# Patient Record
Sex: MALE | Race: WHITE | Employment: OTHER | URBAN - METROPOLITAN AREA
[De-identification: names, ages, dates, MRNs, and addresses within clinical notes are randomized per-mention and may not be internally consistent; named-entity substitution may affect disease eponyms.]

---

## 2018-08-05 ENCOUNTER — HOSPITAL ENCOUNTER (OUTPATIENT)
Age: 63
Setting detail: OBSERVATION
Discharge: HOME HEALTH | End: 2018-08-07
Attending: EMERGENCY MEDICINE | Admitting: FAMILY MEDICINE
Payer: OTHER GOVERNMENT

## 2018-08-05 ENCOUNTER — APPOINTMENT (OUTPATIENT)
Dept: CT IMAGING | Age: 63
End: 2018-08-05
Payer: OTHER GOVERNMENT

## 2018-08-05 ENCOUNTER — APPOINTMENT (OUTPATIENT)
Dept: GENERAL RADIOLOGY | Age: 63
End: 2018-08-05
Payer: OTHER GOVERNMENT

## 2018-08-05 DIAGNOSIS — R55 SYNCOPE AND COLLAPSE: Primary | ICD-10-CM

## 2018-08-05 LAB
ACETAMINOPHEN LEVEL: <5 MCG/ML (ref 10–30)
ALBUMIN SERPL-MCNC: 4.6 G/DL (ref 3.5–5.2)
ALP BLD-CCNC: 73 U/L (ref 40–129)
ALT SERPL-CCNC: 30 U/L (ref 0–40)
AMPHETAMINE SCREEN, URINE: NOT DETECTED
ANION GAP SERPL CALCULATED.3IONS-SCNC: 19 MMOL/L (ref 7–16)
AST SERPL-CCNC: 25 U/L (ref 0–39)
BARBITURATE SCREEN URINE: NOT DETECTED
BENZODIAZEPINE SCREEN, URINE: NOT DETECTED
BILIRUB SERPL-MCNC: 0.3 MG/DL (ref 0–1.2)
BUN BLDV-MCNC: 19 MG/DL (ref 8–23)
CALCIUM SERPL-MCNC: 9.6 MG/DL (ref 8.6–10.2)
CANNABINOID SCREEN URINE: NOT DETECTED
CHLORIDE BLD-SCNC: 97 MMOL/L (ref 98–107)
CO2: 23 MMOL/L (ref 22–29)
COCAINE METABOLITE SCREEN URINE: NOT DETECTED
CREAT SERPL-MCNC: 1.1 MG/DL (ref 0.7–1.2)
D DIMER: 777 NG/ML DDU
ETHANOL: 196 MG/DL (ref 0–0.08)
GFR AFRICAN AMERICAN: >60
GFR NON-AFRICAN AMERICAN: >60 ML/MIN/1.73
GLUCOSE BLD-MCNC: 149 MG/DL (ref 74–109)
HCT VFR BLD CALC: 37.6 % (ref 37–54)
HEMOGLOBIN: 12.7 G/DL (ref 12.5–16.5)
INR BLD: 1.1
MCH RBC QN AUTO: 30 PG (ref 26–35)
MCHC RBC AUTO-ENTMCNC: 33.8 % (ref 32–34.5)
MCV RBC AUTO: 88.9 FL (ref 80–99.9)
METHADONE SCREEN, URINE: NOT DETECTED
OPIATE SCREEN URINE: NOT DETECTED
PDW BLD-RTO: 14.9 FL (ref 11.5–15)
PHENCYCLIDINE SCREEN URINE: NOT DETECTED
PLATELET # BLD: 280 E9/L (ref 130–450)
PMV BLD AUTO: 10.7 FL (ref 7–12)
POTASSIUM SERPL-SCNC: 3.5 MMOL/L (ref 3.5–5)
PROPOXYPHENE SCREEN: NOT DETECTED
PROTHROMBIN TIME: 12.3 SEC (ref 9.3–12.4)
RBC # BLD: 4.23 E12/L (ref 3.8–5.8)
SALICYLATE, SERUM: <0.3 MG/DL (ref 0–30)
SODIUM BLD-SCNC: 139 MMOL/L (ref 132–146)
TOTAL PROTEIN: 7.5 G/DL (ref 6.4–8.3)
TRICYCLIC ANTIDEPRESSANTS SCREEN SERUM: NEGATIVE NG/ML
TROPONIN: <0.01 NG/ML (ref 0–0.03)
WBC # BLD: 10.5 E9/L (ref 4.5–11.5)

## 2018-08-05 PROCEDURE — 85027 COMPLETE CBC AUTOMATED: CPT

## 2018-08-05 PROCEDURE — 80307 DRUG TEST PRSMV CHEM ANLYZR: CPT

## 2018-08-05 PROCEDURE — 70450 CT HEAD/BRAIN W/O DYE: CPT

## 2018-08-05 PROCEDURE — 36415 COLL VENOUS BLD VENIPUNCTURE: CPT

## 2018-08-05 PROCEDURE — 85610 PROTHROMBIN TIME: CPT

## 2018-08-05 PROCEDURE — G0480 DRUG TEST DEF 1-7 CLASSES: HCPCS

## 2018-08-05 PROCEDURE — 84484 ASSAY OF TROPONIN QUANT: CPT

## 2018-08-05 PROCEDURE — 71275 CT ANGIOGRAPHY CHEST: CPT

## 2018-08-05 PROCEDURE — 99285 EMERGENCY DEPT VISIT HI MDM: CPT

## 2018-08-05 PROCEDURE — 71045 X-RAY EXAM CHEST 1 VIEW: CPT

## 2018-08-05 PROCEDURE — 80053 COMPREHEN METABOLIC PANEL: CPT

## 2018-08-05 PROCEDURE — 85378 FIBRIN DEGRADE SEMIQUANT: CPT

## 2018-08-06 ENCOUNTER — APPOINTMENT (OUTPATIENT)
Dept: MRI IMAGING | Age: 63
End: 2018-08-06
Payer: OTHER GOVERNMENT

## 2018-08-06 PROBLEM — R55 SYNCOPE AND COLLAPSE: Status: ACTIVE | Noted: 2018-08-06

## 2018-08-06 PROBLEM — K05.6 PERIODONTAL DISEASE: Status: ACTIVE | Noted: 2018-08-06

## 2018-08-06 PROBLEM — I10 HTN (HYPERTENSION): Status: ACTIVE | Noted: 2018-08-06

## 2018-08-06 PROBLEM — R29.90 STROKE-LIKE SYMPTOMS: Status: ACTIVE | Noted: 2018-08-06

## 2018-08-06 PROBLEM — E11.9 TYPE 2 DIABETES MELLITUS (HCC): Status: ACTIVE | Noted: 2018-08-06

## 2018-08-06 PROBLEM — F10.939 ALCOHOL WITHDRAWAL (HCC): Status: ACTIVE | Noted: 2018-08-06

## 2018-08-06 PROBLEM — F10.929 ALCOHOL INTOXICATION (HCC): Status: ACTIVE | Noted: 2018-08-06

## 2018-08-06 LAB
ANION GAP SERPL CALCULATED.3IONS-SCNC: 19 MMOL/L (ref 7–16)
BACTERIA: ABNORMAL /HPF
BILIRUBIN URINE: NEGATIVE
BLOOD, URINE: NEGATIVE
BUN BLDV-MCNC: 18 MG/DL (ref 8–23)
CALCIUM SERPL-MCNC: 9.6 MG/DL (ref 8.6–10.2)
CHLORIDE BLD-SCNC: 97 MMOL/L (ref 98–107)
CHOLESTEROL, TOTAL: 338 MG/DL (ref 0–199)
CLARITY: CLEAR
CO2: 24 MMOL/L (ref 22–29)
COLOR: YELLOW
CREAT SERPL-MCNC: 1.1 MG/DL (ref 0.7–1.2)
GFR AFRICAN AMERICAN: >60
GFR NON-AFRICAN AMERICAN: >60 ML/MIN/1.73
GLUCOSE BLD-MCNC: 107 MG/DL (ref 74–109)
GLUCOSE URINE: NEGATIVE MG/DL
HBA1C MFR BLD: 7.5 % (ref 4–5.6)
HDLC SERPL-MCNC: 23 MG/DL
KETONES, URINE: ABNORMAL MG/DL
LDL CHOLESTEROL CALCULATED: ABNORMAL MG/DL (ref 0–99)
LEUKOCYTE ESTERASE, URINE: NEGATIVE
MAGNESIUM: 1.9 MG/DL (ref 1.6–2.6)
NITRITE, URINE: NEGATIVE
PH UA: 5.5 (ref 5–9)
POTASSIUM REFLEX MAGNESIUM: 3.4 MMOL/L (ref 3.5–5)
PROTEIN UA: NEGATIVE MG/DL
RBC UA: ABNORMAL /HPF (ref 0–2)
SODIUM BLD-SCNC: 140 MMOL/L (ref 132–146)
SPECIFIC GRAVITY UA: 1.02 (ref 1–1.03)
TRIGL SERPL-MCNC: 548 MG/DL (ref 0–149)
TSH SERPL DL<=0.05 MIU/L-ACNC: 1.54 UIU/ML (ref 0.27–4.2)
UROBILINOGEN, URINE: 0.2 E.U./DL
VLDLC SERPL CALC-MCNC: ABNORMAL MG/DL
WBC UA: ABNORMAL /HPF (ref 0–5)

## 2018-08-06 PROCEDURE — 70551 MRI BRAIN STEM W/O DYE: CPT

## 2018-08-06 PROCEDURE — 80061 LIPID PANEL: CPT

## 2018-08-06 PROCEDURE — 81001 URINALYSIS AUTO W/SCOPE: CPT

## 2018-08-06 PROCEDURE — G8987 SELF CARE CURRENT STATUS: HCPCS

## 2018-08-06 PROCEDURE — 2500000003 HC RX 250 WO HCPCS: Performed by: FAMILY MEDICINE

## 2018-08-06 PROCEDURE — G8988 SELF CARE GOAL STATUS: HCPCS

## 2018-08-06 PROCEDURE — 6360000002 HC RX W HCPCS: Performed by: FAMILY MEDICINE

## 2018-08-06 PROCEDURE — 97166 OT EVAL MOD COMPLEX 45 MIN: CPT

## 2018-08-06 PROCEDURE — 96372 THER/PROPH/DIAG INJ SC/IM: CPT

## 2018-08-06 PROCEDURE — 83735 ASSAY OF MAGNESIUM: CPT

## 2018-08-06 PROCEDURE — 2580000003 HC RX 258: Performed by: FAMILY MEDICINE

## 2018-08-06 PROCEDURE — 84443 ASSAY THYROID STIM HORMONE: CPT

## 2018-08-06 PROCEDURE — 36415 COLL VENOUS BLD VENIPUNCTURE: CPT

## 2018-08-06 PROCEDURE — 6360000004 HC RX CONTRAST MEDICATION: Performed by: RADIOLOGY

## 2018-08-06 PROCEDURE — G0378 HOSPITAL OBSERVATION PER HR: HCPCS

## 2018-08-06 PROCEDURE — 83036 HEMOGLOBIN GLYCOSYLATED A1C: CPT

## 2018-08-06 PROCEDURE — G8979 MOBILITY GOAL STATUS: HCPCS

## 2018-08-06 PROCEDURE — 6370000000 HC RX 637 (ALT 250 FOR IP): Performed by: FAMILY MEDICINE

## 2018-08-06 PROCEDURE — 97161 PT EVAL LOW COMPLEX 20 MIN: CPT

## 2018-08-06 PROCEDURE — 96374 THER/PROPH/DIAG INJ IV PUSH: CPT

## 2018-08-06 PROCEDURE — 6370000000 HC RX 637 (ALT 250 FOR IP): Performed by: INTERNAL MEDICINE

## 2018-08-06 PROCEDURE — 87040 BLOOD CULTURE FOR BACTERIA: CPT

## 2018-08-06 PROCEDURE — 80048 BASIC METABOLIC PNL TOTAL CA: CPT

## 2018-08-06 PROCEDURE — G8978 MOBILITY CURRENT STATUS: HCPCS

## 2018-08-06 RX ORDER — LORAZEPAM 2 MG/ML
4 INJECTION INTRAMUSCULAR
Status: DISCONTINUED | OUTPATIENT
Start: 2018-08-06 | End: 2018-08-07 | Stop reason: HOSPADM

## 2018-08-06 RX ORDER — CLONIDINE HYDROCHLORIDE 0.1 MG/1
0.1 TABLET ORAL 2 TIMES DAILY
Status: ON HOLD | COMMUNITY
End: 2018-08-07 | Stop reason: HOSPADM

## 2018-08-06 RX ORDER — HYDROCHLOROTHIAZIDE 12.5 MG/1
12.5 TABLET ORAL DAILY
Status: DISCONTINUED | OUTPATIENT
Start: 2018-08-06 | End: 2018-08-07 | Stop reason: HOSPADM

## 2018-08-06 RX ORDER — ASPIRIN 81 MG/1
81 TABLET ORAL DAILY
Status: DISCONTINUED | OUTPATIENT
Start: 2018-08-06 | End: 2018-08-07 | Stop reason: HOSPADM

## 2018-08-06 RX ORDER — LORAZEPAM 2 MG/ML
2 INJECTION INTRAMUSCULAR
Status: DISCONTINUED | OUTPATIENT
Start: 2018-08-06 | End: 2018-08-07 | Stop reason: HOSPADM

## 2018-08-06 RX ORDER — THIAMINE MONONITRATE (VIT B1) 100 MG
100 TABLET ORAL DAILY
Status: DISCONTINUED | OUTPATIENT
Start: 2018-08-07 | End: 2018-08-07 | Stop reason: HOSPADM

## 2018-08-06 RX ORDER — ONDANSETRON 2 MG/ML
4 INJECTION INTRAMUSCULAR; INTRAVENOUS EVERY 6 HOURS PRN
Status: DISCONTINUED | OUTPATIENT
Start: 2018-08-06 | End: 2018-08-07 | Stop reason: HOSPADM

## 2018-08-06 RX ORDER — FOLIC ACID 1 MG/1
1 TABLET ORAL DAILY
Status: DISCONTINUED | OUTPATIENT
Start: 2018-08-07 | End: 2018-08-07 | Stop reason: HOSPADM

## 2018-08-06 RX ORDER — LORAZEPAM 2 MG/ML
0.5 INJECTION INTRAMUSCULAR ONCE
Status: COMPLETED | OUTPATIENT
Start: 2018-08-06 | End: 2018-08-06

## 2018-08-06 RX ORDER — CHLORAL HYDRATE 500 MG
3000 CAPSULE ORAL 3 TIMES DAILY
Status: ON HOLD | COMMUNITY
End: 2018-08-07

## 2018-08-06 RX ORDER — LORAZEPAM 1 MG/1
1 TABLET ORAL
Status: DISCONTINUED | OUTPATIENT
Start: 2018-08-06 | End: 2018-08-07 | Stop reason: HOSPADM

## 2018-08-06 RX ORDER — GLYBURIDE 5 MG/1
5 TABLET ORAL
Status: ON HOLD | COMMUNITY
End: 2018-08-07 | Stop reason: HOSPADM

## 2018-08-06 RX ORDER — LORAZEPAM 1 MG/1
4 TABLET ORAL
Status: DISCONTINUED | OUTPATIENT
Start: 2018-08-06 | End: 2018-08-07 | Stop reason: HOSPADM

## 2018-08-06 RX ORDER — LORAZEPAM 1 MG/1
3 TABLET ORAL
Status: DISCONTINUED | OUTPATIENT
Start: 2018-08-06 | End: 2018-08-07 | Stop reason: HOSPADM

## 2018-08-06 RX ORDER — POTASSIUM CHLORIDE AND SODIUM CHLORIDE 450; 150 MG/100ML; MG/100ML
INJECTION, SOLUTION INTRAVENOUS CONTINUOUS
Status: DISCONTINUED | OUTPATIENT
Start: 2018-08-06 | End: 2018-08-07 | Stop reason: HOSPADM

## 2018-08-06 RX ORDER — SODIUM CHLORIDE 0.9 % (FLUSH) 0.9 %
10 SYRINGE (ML) INJECTION PRN
Status: DISCONTINUED | OUTPATIENT
Start: 2018-08-06 | End: 2018-08-07 | Stop reason: HOSPADM

## 2018-08-06 RX ORDER — LISINOPRIL 40 MG/1
40 TABLET ORAL DAILY
Status: ON HOLD | COMMUNITY
End: 2018-08-07

## 2018-08-06 RX ORDER — POTASSIUM CHLORIDE 20 MEQ/1
40 TABLET, EXTENDED RELEASE ORAL ONCE
Status: COMPLETED | OUTPATIENT
Start: 2018-08-06 | End: 2018-08-06

## 2018-08-06 RX ORDER — LORAZEPAM 2 MG/ML
1 INJECTION INTRAMUSCULAR
Status: DISCONTINUED | OUTPATIENT
Start: 2018-08-06 | End: 2018-08-07 | Stop reason: HOSPADM

## 2018-08-06 RX ORDER — LORAZEPAM 1 MG/1
2 TABLET ORAL
Status: DISCONTINUED | OUTPATIENT
Start: 2018-08-06 | End: 2018-08-07 | Stop reason: HOSPADM

## 2018-08-06 RX ORDER — HYDROCHLOROTHIAZIDE 12.5 MG/1
12.5 TABLET ORAL DAILY
COMMUNITY

## 2018-08-06 RX ORDER — AMLODIPINE BESYLATE 5 MG/1
5 TABLET ORAL DAILY
Status: ON HOLD | COMMUNITY
End: 2018-08-07 | Stop reason: HOSPADM

## 2018-08-06 RX ORDER — MULTIVITAMIN WITH FOLIC ACID 400 MCG
1 TABLET ORAL DAILY
Status: DISCONTINUED | OUTPATIENT
Start: 2018-08-07 | End: 2018-08-07 | Stop reason: HOSPADM

## 2018-08-06 RX ORDER — LORAZEPAM 2 MG/ML
3 INJECTION INTRAMUSCULAR
Status: DISCONTINUED | OUTPATIENT
Start: 2018-08-06 | End: 2018-08-07 | Stop reason: HOSPADM

## 2018-08-06 RX ORDER — SODIUM CHLORIDE 0.9 % (FLUSH) 0.9 %
10 SYRINGE (ML) INJECTION EVERY 12 HOURS SCHEDULED
Status: DISCONTINUED | OUTPATIENT
Start: 2018-08-06 | End: 2018-08-07 | Stop reason: HOSPADM

## 2018-08-06 RX ADMIN — Medication 10 ML: at 04:31

## 2018-08-06 RX ADMIN — HYDROCHLOROTHIAZIDE 12.5 MG: 12.5 TABLET ORAL at 18:26

## 2018-08-06 RX ADMIN — ENOXAPARIN SODIUM 40 MG: 100 INJECTION SUBCUTANEOUS at 08:39

## 2018-08-06 RX ADMIN — POTASSIUM CHLORIDE 40 MEQ: 20 TABLET, EXTENDED RELEASE ORAL at 07:05

## 2018-08-06 RX ADMIN — IOPAMIDOL 60 ML: 755 INJECTION, SOLUTION INTRAVENOUS at 00:22

## 2018-08-06 RX ADMIN — FOLIC ACID: 5 INJECTION, SOLUTION INTRAMUSCULAR; INTRAVENOUS; SUBCUTANEOUS at 04:26

## 2018-08-06 RX ADMIN — LORAZEPAM 0.5 MG: 2 INJECTION INTRAMUSCULAR; INTRAVENOUS at 02:51

## 2018-08-06 RX ADMIN — SODIUM CHLORIDE AND POTASSIUM CHLORIDE: 4.5; 1.49 INJECTION, SOLUTION INTRAVENOUS at 16:06

## 2018-08-06 RX ADMIN — ASPIRIN 81 MG: 81 TABLET, COATED ORAL at 08:39

## 2018-08-06 ASSESSMENT — PAIN DESCRIPTION - ONSET: ONSET: GRADUAL

## 2018-08-06 ASSESSMENT — PAIN SCALES - GENERAL
PAINLEVEL_OUTOF10: 4
PAINLEVEL_OUTOF10: 5

## 2018-08-06 ASSESSMENT — PAIN DESCRIPTION - PAIN TYPE
TYPE: ACUTE PAIN
TYPE: ACUTE PAIN

## 2018-08-06 ASSESSMENT — PAIN DESCRIPTION - FREQUENCY
FREQUENCY: INTERMITTENT
FREQUENCY: INTERMITTENT

## 2018-08-06 ASSESSMENT — PAIN DESCRIPTION - LOCATION
LOCATION: HEAD
LOCATION: GENERALIZED

## 2018-08-06 ASSESSMENT — PAIN DESCRIPTION - DESCRIPTORS
DESCRIPTORS: ACHING;DISCOMFORT;SORE
DESCRIPTORS: ACHING;DISCOMFORT;SORE

## 2018-08-06 ASSESSMENT — PAIN DESCRIPTION - ORIENTATION: ORIENTATION: POSTERIOR

## 2018-08-06 NOTE — H&P
pertinent surgical history. Medications Prior to Admission: Not available at this time     Prior to Admission medications    Not on File       Allergies:  Patient has no known allergies. Social History:      The patient currently lives At home. TOBACCO:   reports that he has never smoked. He has never used smokeless tobacco.  ETOH:   reports that he drinks alcohol. drinks vodka almost on a daily basis 4-5 drinks. Denies drug use. Family History:     Reviewed in detail Positive as follows: Coronary artery disease, diabetes and hypertension    History reviewed. No pertinent family history. REVIEW OF SYSTEMS:   Pertinent positives as noted in the HPI. All other systems reviewed and negative. PHYSICAL EXAM:    BP (!) 163/99   Pulse 83   Temp 98.1 °F (36.7 °C)   Resp 20   Ht 5' 7\" (1.702 m)   Wt 170 lb (77.1 kg)   SpO2 99%   BMI 26.63 kg/m²     General appearance:  Middle-aged male, appears weak, No apparent distress painful or respiratory, appears stated age and cooperative. HEENT:  Normal cephalic, atraumatic without obvious deformity. Pupils equal, round, and reactive to light. Extra ocular muscles intact. Conjunctivae/corneas clear. Poor dentition with partial loss of tooth right upper jaw  Neck: Supple, with full range of motion. No jugular venous distention. Trachea midline. Respiratory:  Normal respiratory effort. Clear to auscultation, bilaterally without Rales/Wheezes/Rhonchi. Cardiovascular:  Regular rate and rhythm with normal S1/S2 without murmurs, rubs or gallops. Abdomen: Soft, non-tender, non-distended with normal bowel sounds. Musculoskeletal:  No clubbing, cyanosis or edema bilaterally. Full range of motion without deformity. Skin: Skin color, texture, turgor normal.  No rashes or lesions. Neurologic:  Tremors worse with activity.  Cranial nerves: II-XII intact, grossly non-focal.  Psychiatric:  Alert and oriented, thought content appropriate, normal insight  Capillary Continuous cardiac monitoring to evaluate for cardiac arrhythmias. Orthostatic blood pressure check. IV fluids. #2. Alcohol intoxication and withdrawal. Put on CIWA protocol with Ativan. Banana bag. Replace vitamins. #3. Strokelike symptoms. Patient complaining of numbness and subjective weakness of the left side. Plan for MRI. If stroke is found on MRI, would obtain echo and carotid studies. Lipid panel. neuro consult as needed. #4. Periodontal disease. No pain at the moment. Can see dentist outpatient. #5. Type II diabetes. Insulin sliding scale. Check A1c.  6. Hypertension, uncontrolled, home meds. 7. Multiple mediastinal and axillary lymphadenopathy of undetermined significance. Follow-up imaging outpatient. DVT Prophylaxis: Lovenox  Diet:  cardiac  Code Status: No Order FULL    PT/OT Eval Status: eval and treat    Dispo - obs/tele       Micah King MD    Thank you No primary care provider on file. for the opportunity to be involved in this patient's care.

## 2018-08-06 NOTE — ED NOTES
Bed: 05  Expected date:   Expected time:   Means of arrival:   Comments:  Anne Crawford RN  08/05/18 [id number]

## 2018-08-06 NOTE — ED PROVIDER NOTES
Department of Emergency Medicine   ED  Provider Note  Admit Date/RoomTime: 8/5/2018 10:38 PM  ED Room: 05/05          History of Present Illness:  8/5/18, Time: 10:37 PM         Stuart Hanson is a 60 y.o. male presenting to the ED for loss of consciousness, beginning today. The complaint has been intermittent, moderate in severity, and worsened by nothing. Pt arrived via EMS from home. Pt had two syncopal episodes today. Pt states he was walking into his house when he woke upon the cement floor. He complains of left-sided head pressure, ongoing for a few hours now. Pt reports no associated chest pain or shortness of breath. Pt reports no neck or back pain. He reports no fevers or cough. [facility] Review of Systems:   Pertinent positives and negatives are stated within HPI, all other systems reviewed and are negative.      --------------------------------------------- PAST HISTORY ---------------------------------------------  Past Medical History:  has no past medical history on file. Past Surgical History:  has no past surgical history on file. Social History:  reports that he has never smoked. He has never used smokeless tobacco. He reports that he drinks alcohol. He reports that he does not use drugs. Family History: family history is not on file. The patients home medications have been reviewed. Allergies: Patient has no known allergies.       ---------------------------------------------------PHYSICAL EXAM--------------------------------------    Constitutional/General: Alert and oriented x3, slow to respond, Mild distress  Head: Normocephalic and atraumatic  Eyes: PERRL, EOMI, conjunctiva normal  Mouth: Oropharynx clear, handling secretions, no trismus, no asymmetry of the posterior oropharynx or uvular edema  Neck: Supple, full ROM, non tender to palpation in the midline, no stridor, no crepitus, no meningeal signs  Respiratory: Lungs clear to D-Dimer, Quantitative   Result Value Ref Range    D-Dimer, Quant 777 ng/mL DDU   Serum Drug Screen   Result Value Ref Range    Ethanol Lvl 196 mg/dL    Acetaminophen Level <5.0 (L) 10.0 - 97.0 mcg/mL    Salicylate, Serum <9.8 0.0 - 30.0 mg/dL    TCA Scrn NEGATIVE Cutoff:300 ng/mL   Urine Drug Screen   Result Value Ref Range    Amphetamine Screen, Urine NOT DETECTED Negative <1000 ng/mL    Barbiturate Screen, Ur NOT DETECTED Negative < 200 ng/mL    Benzodiazepine Screen, Urine NOT DETECTED Negative < 200 ng/mL    Cannabinoid Scrn, Ur NOT DETECTED Negative < 50ng/mL    Cocaine Metabolite Screen, Urine NOT DETECTED Negative < 300 ng/mL    Opiate Scrn, Ur NOT DETECTED Negative < 300ng/mL    PCP Screen, Urine NOT DETECTED Negative < 25 ng/mL    Methadone Screen, Urine NOT DETECTED Negative <300 ng/mL    Propoxyphene Scrn, Ur NOT DETECTED Negative <300 ng/mL       RADIOLOGY:  Interpreted by Radiologist.  CTA CHEST W CONTRAST   Final Result      1. No pulmonary embolism. 2.  Incidental/non-acute findings are described above. This report has been electronically signed by Dony Silva MD.      CT Head WO Contrast   Final Result   1. No acute intracranial hemorrhage identified. 2.  Periapical lucency/periodontal disease in the right maxilla with a tooth    fragment noted. 3.  Additional nonacute/incidental findings as described above. This report has been electronically signed by Melissa Piper MD.      XR CHEST PORTABLE   Final Result   No acute cardiopulmonary process. EKG:  EKG: This EKG is signed and interpreted by me. Rate: 87  Rhythm: Sinus  Interpretation: non-specific EKG  Comparison: no previous EKG available        ------------------------- NURSING NOTES AND VITALS REVIEWED ---------------------------   The nursing notes within the ED encounter and vital signs as below have been reviewed by myself.   BP (!) 160/79   Pulse 87   Temp 98.1 °F (36.7 °C)   Resp 18   Ht 5' 7\" (1.702 m)   Wt 170 lb (77.1 kg)   SpO2 96%   BMI 26.63 kg/m²   Oxygen Saturation Interpretation: Normal    The patients available past medical records and past encounters were reviewed. ------------------------------ ED COURSE/MEDICAL DECISION MAKING----------------------  Medications   iopamidol (ISOVUE-370) 76 % injection 60 mL (60 mLs Intravenous Given 8/6/18 0022)       Medical Decision Making:    Pt arrived via EMS for syncopal episodes. Pt evaluated. Imaging, EKG, and labs obtained and reviewed. Results discussed with pt. This patient's ED course included: a personal history and physicial examination, re-evaluation prior to disposition, cardiac monitoring and continuous pulse oximetry    This patient has been closely monitored during their ED course. Re-Evaluations:           Re-evaluation. Patients symptoms show no change   rechecked and neurologically intact. Patient is in no distress. Consultations:  IM    Counseling: The emergency provider has spoken with the patient and discussed todays results, in addition to providing specific details for the plan of care and counseling regarding the diagnosis and prognosis. Questions are answered at this time and they are agreeable with the plan.       --------------------------------- IMPRESSION AND DISPOSITION ---------------------------------    IMPRESSION  1. Syncope and collapse        DISPOSITION  Disposition: admit  Patient condition is stable    8/5/18, 10:37 PM.    This note is prepared by Sherly Gupta, acting as Scribe for Sukumar Rivas MD.    Sukumar Rivas MD:  The scribe's documentation has been prepared under my direction and personally reviewed by me in its entirety. I confirm that the note above accurately reflects all work, treatment, procedures, and medical decision making performed by me.             Sukumar Rivas MD  08/05/18 730 [facility] Jamaica Cooper MD  08/06/18 1704 Primitivo Huertas MD  08/06/18 6830

## 2018-08-06 NOTE — PROGRESS NOTES
Physical Therapy    Facility/Department: [address] PICU  Initial Assessment    NAME: Cece Stage  :   MRN: [id number]    Date of Service: 2018    Evaluating Therapist: Chapin Kenny PT, DPT    Room #: 7751C  DIAGNOSIS: Syncope and collapse  PRECAUTIONS: Falls    Social:  Pt lives alone in a 2 floor plan with 1+4 step(s) and 1 rail(s) to enter. Bedroom/bathroom available on first and second level. Flight with rail to second level. Basement with flight and 1 rail. Prior to admission pt walked with no AD. Reported independent. Driving. Initial Evaluation  Date: 18 Treatment  Date: NA    Short Term/ Long Term   Goals   Was pt agreeable to Eval/treatment? Yes      Does pt have pain? Reported R low back and flank pain. Did not quantify. Bed Mobility  Rolling: Supervision  Supine to sit: Supervision  Sit to supine: Supervision  Scooting: Supervision  Independent   Transfers Sit to stand: SBA  Stand to sit: SBA  Stand pivot: SBA  Independent   Ambulation    85 feet with no AD with Min to Mod A  >300 feet with no AD Independently   Stair negotiation: ascended and descended  NT  10 steps with 1 rail with Supervision   AM-PAC Score 18/24       Pt is alert and Oriented x 3, increased time to complete tasks. Poor insight to deficits. BLE ROM is WFL. BLE strength is grossly 4/5. Balance: Seated: Supervision; Standing: Min A with no AD  Sensation: Denied N/T  Edema: None noted. Endurance: Fair  Bed/Chair alarm: Yes     ASSESSMENT  Pt displays functional ability as noted in the objective portion of this evaluation. Comments/Treatment:  Patient cleared by nurse and agreeable to assessment. Patient was self assisting BLEs off the bed but able to clear both on return to supine. Patient denied dizziness with positional change but was SOB due to holding his breath during supine to sit task. Breathing returned to normal once seated. Patient assisted to standing and mildly unsteady on his feet. Patient demonstrated narrow WILNER, bilateral knee buckling, and mild scissoring 2-3 times with mild lateral/anterior LOB during gait. Patient unsure of reason for imbalance and it is unclear if it is related to ETOH withdrawal. Patient assisted back to supine with call light and tray table in reach. Patient education  Pt educated on safety with mobility, transfers, gait. Patient response to education:   Pt verbalized understanding Pt demonstrated skill Pt requires further education in this area   Yes  Yes with verbal cues and assist Yes      Rehab potential is Good for reaching above PT goals. Pts/ family goals   1. To go home. Patient and or family understand(s) diagnosis, prognosis, and plan of care. Questionable      PLAN  PT care will be provided in accordance with the objectives noted above. Whenever appropriate, clear delegation orders will be provided for nursing staff. Exercises and functional mobility practice will be used as well as appropriate assistive devices or modalities to obtain goals. Patient and family education will also be administered as needed. Frequency of treatments will be 2-5x/week x 1-3 days.     Time in:  1316  Time out: [address], PT, DPT   License number:  [id number]

## 2018-08-07 VITALS
RESPIRATION RATE: 18 BRPM | DIASTOLIC BLOOD PRESSURE: 90 MMHG | HEIGHT: 67 IN | OXYGEN SATURATION: 93 % | WEIGHT: 160.5 LBS | BODY MASS INDEX: 25.19 KG/M2 | TEMPERATURE: 97.6 F | SYSTOLIC BLOOD PRESSURE: 168 MMHG | HEART RATE: 75 BPM

## 2018-08-07 PROCEDURE — G0378 HOSPITAL OBSERVATION PER HR: HCPCS

## 2018-08-07 PROCEDURE — 6370000000 HC RX 637 (ALT 250 FOR IP): Performed by: FAMILY MEDICINE

## 2018-08-07 PROCEDURE — 6360000002 HC RX W HCPCS: Performed by: FAMILY MEDICINE

## 2018-08-07 PROCEDURE — 6370000000 HC RX 637 (ALT 250 FOR IP): Performed by: INTERNAL MEDICINE

## 2018-08-07 RX ORDER — LISINOPRIL 40 MG/1
20 TABLET ORAL DAILY
Qty: 30 TABLET | Refills: 3 | Status: SHIPPED | OUTPATIENT
Start: 2018-08-07

## 2018-08-07 RX ORDER — CHLORAL HYDRATE 500 MG
1000 CAPSULE ORAL 3 TIMES DAILY
Qty: 90 CAPSULE | Refills: 3 | Status: SHIPPED | OUTPATIENT
Start: 2018-08-07

## 2018-08-07 RX ORDER — ATORVASTATIN CALCIUM 40 MG/1
40 TABLET, FILM COATED ORAL DAILY
COMMUNITY

## 2018-08-07 RX ORDER — MULTIVITAMIN WITH FOLIC ACID 400 MCG
1 TABLET ORAL DAILY
Qty: 90 TABLET | Refills: 0 | COMMUNITY
Start: 2018-08-08

## 2018-08-07 RX ADMIN — Medication 100 MG: at 09:35

## 2018-08-07 RX ADMIN — HYDROCHLOROTHIAZIDE 12.5 MG: 12.5 TABLET ORAL at 09:35

## 2018-08-07 RX ADMIN — SODIUM CHLORIDE AND POTASSIUM CHLORIDE: 4.5; 1.49 INJECTION, SOLUTION INTRAVENOUS at 05:25

## 2018-08-07 RX ADMIN — ASPIRIN 81 MG: 81 TABLET, COATED ORAL at 09:35

## 2018-08-07 RX ADMIN — FOLIC ACID 1 MG: 1 TABLET ORAL at 09:35

## 2018-08-07 RX ADMIN — MULTIVITAMIN TABLET 1 TABLET: TABLET at 09:35

## 2018-08-07 ASSESSMENT — PAIN SCALES - GENERAL
PAINLEVEL_OUTOF10: 0
PAINLEVEL_OUTOF10: 0

## 2018-08-07 NOTE — DISCHARGE SUMMARY
Hospital Medicine Discharge Summary    Patient ID: Jhon Rhodes      Patient's PCP: Jesse Hurley MD    Admit Date: 8/5/2018     Discharge Date:   08/07/18     Admitting Physician: Seble Escalona MD     Discharge Physician: DANIELLE Diaz - CNP     Discharge Diagnoses: Active Hospital Problems    Diagnosis Date Noted    Syncope and collapse [R55] 08/06/2018    Alcohol intoxication ([facility] [address] [F10.929] 08/06/2018    [facility] [F10.239] 08/06/2018    HTN (hypertension) [I10] 08/06/2018    Periodontal disease [K05.6] 08/06/2018    Stroke-like symptoms [R29.90] 08/06/2018    [facility] 2 diabetes [facility] [E11.9] 08/06/2018       The patient was seen and examined on day of discharge and this discharge summary is in conjunction with any daily progress note from day of discharge. Hospital Course: 60 y.o. male who presented to [facility] with past medical history of diabetes, hypertension and alcohol abuse. Patient was brought by EMS with complaints of \"passing out\" at home while drinking Vodka. These occurred twice at home day prior to arrival.  No chest pain or shortness of breath. No headaches. No abdominal pain nausea or vomiting. He had just restarted his amlodipine after not taking it for about 5 days. He states that his PCP as been trying to change some of his medications. blood alcohol level 196. Urine drug screen is negative. Chest x-ray is negative. CT scan of the head negative for any acute intracranial findings. Did show periodontal disease in the right maxilla. EKG shows normal sinus rhythm rate of 87 with no acute ST-T wave changes. CTA of the chest negative for PE. Did show several nonspecific lymph nodes within the mediastinum and axilla. Negative orthostatics here. IVF given. Recommended echocardiogram, patient states that he would rather have outpatient at the [place]. Discussed etoh cessation and decrease consumption.      Exam:     BP (!) 168/90   Pulse 75   Temp 97.6 °F (36.4 °C) (Temporal)   Resp 18   Ht 5' 7\" (1.702 m)   Wt 160 lb 8 oz (72.8 kg)   SpO2 93%   BMI 25.14 kg/m²     General appearance: No apparent distress, appears stated age and cooperative. HEENT: Pupils equal, round, and reactive to light. Conjunctivae/corneas clear. Neck: Supple, with full range of motion. No jugular venous distention. Trachea midline. Respiratory:  Normal respiratory effort. Clear to auscultation, bilaterally without Rales/Wheezes/Rhonchi. Cardiovascular: Regular rate and rhythm with normal S1/S2 without murmurs, rubs or gallops. Abdomen: Soft, non-tender, non-distended with normal bowel sounds. Musculoskeletal: No clubbing, cyanosis or edema bilaterally. Full range of motion without deformity. Skin: Skin color, texture, turgor normal.  No rashes or lesions. Neurologic:  Neurovascularly intact without any focal sensory/motor deficits. Psychiatric: Alert and oriented, thought content appropriate, normal insight      Consults:     IP CONSULT TO INTERNAL MEDICINE  IP CONSULT TO SOCIAL WORK    Significant Diagnostic Studies:    Ct Head Wo Contrast  Result Date: 8/5/2018  1. No acute intracranial hemorrhage identified. 2.  Periapical lucency/periodontal disease in the right maxilla with a tooth fragment noted. 3.  Additional nonacute/incidental findings as described above. This report has been electronically signed by Dread Hidalgo MD.    Xr Chest Portable  Result Date: 8/5/2018  No acute cardiopulmonary process. Cta Chest W Contrast  Result Date: 8/6/2018  1. No pulmonary embolism. 2.  Incidental/non-acute findings are described above. Mri Brain Wo Contrast  Result Date: 8/6/2018  1. No evidence of intracranial hemorrhage, extra axial collection, space-occupying mass lesion or mass effect, midline shift, or acute territorial infarction. 2. Chronic involutional changes and mild microvascular ischemic disease as described above.     Disposition:  Home     Discharge

## 2018-08-09 LAB
EKG ATRIAL RATE: 75 BPM
EKG P AXIS: 36 DEGREES
EKG P-R INTERVAL: 128 MS
EKG Q-T INTERVAL: 376 MS
EKG QRS DURATION: 96 MS
EKG QTC CALCULATION (BAZETT): 419 MS
EKG R AXIS: 30 DEGREES
EKG T AXIS: 42 DEGREES
EKG VENTRICULAR RATE: 75 BPM

## 2018-08-11 LAB
BLOOD CULTURE, ROUTINE: NORMAL
CULTURE, BLOOD 2: NORMAL

## 2024-05-29 NOTE — PROGRESS NOTES
days/week    Patient / Family Goal: \"I'll take care of everything myself. \"    Pt/Family participated in the establishment of the following goals:      Short term goals  Time Frame: 1 week    GOAL (1)  Pt will complete Self-Feeding with MOD I w/ use of Adaptive equip/techs PRN  GOAL (2)  Pt will complete Hygiene/Grooming with MOD I while standing at the sink. GOAL (3)  Pt will complete Dressing/Toileting with MOD I w/ use of DME/AD/Adaptive equip/techs PRN. GOAL (4)  Pt will complete Functional Transfers and Functional Mobility with MOD I w/ use of DME/AD PRN. GOAL (5)  Pt will tolerate standing > 10 minutes with MOD I for completion of Functional Ax. GOAL (6)  Pt will INDly Demo Good Safety Awareness w/ completion of all Functional Ax. Patient and/or family understands diagnosis, prognosis and plan of care: yes    Yes []  No [x]  Malnutrition indicators have been identified and nursing has been notified to ensure a dietitian consult is ordered. Time in: 1325  Time out: [place] Completed:   Moderate - 35 Minutes  Timed Treatment:  5 Minutes        Jb Aleman, OTR/L  # [id number] Home